# Patient Record
Sex: MALE | Race: WHITE | Employment: FULL TIME | URBAN - METROPOLITAN AREA
[De-identification: names, ages, dates, MRNs, and addresses within clinical notes are randomized per-mention and may not be internally consistent; named-entity substitution may affect disease eponyms.]

---

## 2019-11-12 DIAGNOSIS — I71.21 ASCENDING AORTIC ANEURYSM: Primary | ICD-10-CM

## 2019-12-06 ENCOUNTER — TELEPHONE (OUTPATIENT)
Dept: CARDIOTHORACIC SURGERY | Age: 54
End: 2019-12-06

## 2020-03-03 ENCOUNTER — HOSPITAL ENCOUNTER (OUTPATIENT)
Dept: MRI IMAGING | Age: 55
Discharge: HOME | End: 2020-03-05
Payer: COMMERCIAL

## 2020-03-03 PROCEDURE — 70551 MRI BRAIN STEM W/O DYE: CPT

## 2020-03-03 PROCEDURE — 72141 MRI NECK SPINE W/O DYE: CPT

## 2020-03-03 PROCEDURE — 73221 MRI JOINT UPR EXTREM W/O DYE: CPT

## 2020-03-03 PROCEDURE — 72146 MRI CHEST SPINE W/O DYE: CPT

## 2020-03-04 ENCOUNTER — APPOINTMENT (OUTPATIENT)
Dept: CT IMAGING | Age: 55
End: 2020-03-04
Payer: COMMERCIAL

## 2020-03-04 ENCOUNTER — HOSPITAL ENCOUNTER (EMERGENCY)
Age: 55
Discharge: HOME | End: 2020-03-04
Attending: EMERGENCY MEDICINE
Payer: COMMERCIAL

## 2020-03-04 ENCOUNTER — APPOINTMENT (OUTPATIENT)
Dept: GENERAL RADIOLOGY | Age: 55
End: 2020-03-04
Payer: COMMERCIAL

## 2020-03-04 VITALS
OXYGEN SATURATION: 98 % | WEIGHT: 220 LBS | SYSTOLIC BLOOD PRESSURE: 128 MMHG | TEMPERATURE: 97.6 F | DIASTOLIC BLOOD PRESSURE: 87 MMHG | HEART RATE: 78 BPM | RESPIRATION RATE: 18 BRPM | BODY MASS INDEX: 28.23 KG/M2 | HEIGHT: 74 IN

## 2020-03-04 LAB
ALBUMIN SERPL-MCNC: 4 G/DL (ref 3.5–5.2)
ALP BLD-CCNC: 106 U/L (ref 40–129)
ALT SERPL-CCNC: 22 U/L (ref 0–40)
ANION GAP SERPL CALCULATED.3IONS-SCNC: 11 MMOL/L (ref 7–16)
AST SERPL-CCNC: 19 U/L (ref 0–39)
BASOPHILS ABSOLUTE: 0.09 E9/L (ref 0–0.2)
BASOPHILS RELATIVE PERCENT: 1.1 % (ref 0–2)
BILIRUB SERPL-MCNC: <0.2 MG/DL (ref 0–1.2)
BUN BLDV-MCNC: 25 MG/DL (ref 6–20)
CALCIUM SERPL-MCNC: 9.7 MG/DL (ref 8.6–10.2)
CHLORIDE BLD-SCNC: 101 MMOL/L (ref 98–107)
CO2: 27 MMOL/L (ref 22–29)
CREAT SERPL-MCNC: 0.6 MG/DL (ref 0.7–1.2)
EOSINOPHILS ABSOLUTE: 0.26 E9/L (ref 0.05–0.5)
EOSINOPHILS RELATIVE PERCENT: 3.2 % (ref 0–6)
GFR AFRICAN AMERICAN: >60
GFR NON-AFRICAN AMERICAN: >60 ML/MIN/1.73
GLUCOSE BLD-MCNC: 99 MG/DL (ref 74–99)
HCT VFR BLD CALC: 32.5 % (ref 37–54)
HEMOGLOBIN: 10.2 G/DL (ref 12.5–16.5)
IMMATURE GRANULOCYTES #: 0.04 E9/L
IMMATURE GRANULOCYTES %: 0.5 % (ref 0–5)
LYMPHOCYTES ABSOLUTE: 2.91 E9/L (ref 1.5–4)
LYMPHOCYTES RELATIVE PERCENT: 35.5 % (ref 20–42)
MCH RBC QN AUTO: 30.9 PG (ref 26–35)
MCHC RBC AUTO-ENTMCNC: 31.4 % (ref 32–34.5)
MCV RBC AUTO: 98.5 FL (ref 80–99.9)
MONOCYTES ABSOLUTE: 0.88 E9/L (ref 0.1–0.95)
MONOCYTES RELATIVE PERCENT: 10.7 % (ref 2–12)
NEUTROPHILS ABSOLUTE: 4.01 E9/L (ref 1.8–7.3)
NEUTROPHILS RELATIVE PERCENT: 49 % (ref 43–80)
PDW BLD-RTO: 13.9 FL (ref 11.5–15)
PLATELET # BLD: 595 E9/L (ref 130–450)
PMV BLD AUTO: 8.6 FL (ref 7–12)
POTASSIUM REFLEX MAGNESIUM: 4.1 MMOL/L (ref 3.5–5)
RBC # BLD: 3.3 E12/L (ref 3.8–5.8)
SODIUM BLD-SCNC: 139 MMOL/L (ref 132–146)
TOTAL PROTEIN: 7.1 G/DL (ref 6.4–8.3)
WBC # BLD: 8.2 E9/L (ref 4.5–11.5)

## 2020-03-04 PROCEDURE — 73000 X-RAY EXAM OF COLLAR BONE: CPT

## 2020-03-04 PROCEDURE — 80053 COMPREHEN METABOLIC PANEL: CPT

## 2020-03-04 PROCEDURE — 74177 CT ABD & PELVIS W/CONTRAST: CPT

## 2020-03-04 PROCEDURE — 85025 COMPLETE CBC W/AUTO DIFF WBC: CPT

## 2020-03-04 PROCEDURE — 6360000004 HC RX CONTRAST MEDICATION: Performed by: RADIOLOGY

## 2020-03-04 PROCEDURE — 99284 EMERGENCY DEPT VISIT MOD MDM: CPT

## 2020-03-04 RX ORDER — ACETAMINOPHEN 500 MG
1000 TABLET ORAL EVERY 6 HOURS PRN
COMMUNITY

## 2020-03-04 RX ORDER — ASPIRIN 325 MG
325 TABLET ORAL EVERY 6 HOURS PRN
COMMUNITY
End: 2020-04-15 | Stop reason: ALTCHOICE

## 2020-03-04 RX ORDER — ATENOLOL 25 MG/1
25 TABLET ORAL DAILY
COMMUNITY

## 2020-03-04 RX ADMIN — IOPAMIDOL 80 ML: 755 INJECTION, SOLUTION INTRAVENOUS at 18:12

## 2020-03-04 ASSESSMENT — ENCOUNTER SYMPTOMS
COLOR CHANGE: 0
NAUSEA: 0
SHORTNESS OF BREATH: 0
DIARRHEA: 0
ABDOMINAL DISTENTION: 1
ABDOMINAL PAIN: 1
BACK PAIN: 0
VOMITING: 0
BLOOD IN STOOL: 0

## 2020-03-04 ASSESSMENT — PAIN DESCRIPTION - PAIN TYPE: TYPE: ACUTE PAIN

## 2020-03-04 ASSESSMENT — PAIN SCALES - GENERAL: PAINLEVEL_OUTOF10: 4

## 2020-03-04 ASSESSMENT — PAIN DESCRIPTION - LOCATION: LOCATION: SHOULDER;FLANK

## 2020-03-04 ASSESSMENT — PAIN DESCRIPTION - ORIENTATION: ORIENTATION: LEFT

## 2020-03-04 NOTE — ED PROVIDER NOTES
Patient presents to the ED for evaluation. Patient had his spleen removed after an injury he sustained early February. At that time he had several rib fractures and injury to his spleen. He comes in today with a complaint of persistent left shoulder pain and a fullness in his left upper abdomen. Denies any bruising on the abdomen or the shoulder. He did have MRIs of his cervical, thoracic, and shoulder yesterday. Spine MRIs were unremarkable. The shoulder MRI showed findings of AC joint sprain. He does occasionally report dizziness with standing. Denies any associated nausea, vomiting, or diarrhea. No blood in the stool or urine. Rates his level of discomfort a 4 out of 10. Has been taking Tylenol for his discomfort which does provide mild relief. Is not on blood thinners. Review of Systems   Constitutional: Negative for chills, diaphoresis, fatigue and fever. Eyes: Negative for visual disturbance. Respiratory: Negative for shortness of breath. Cardiovascular: Negative for chest pain. Gastrointestinal: Positive for abdominal distention and abdominal pain. Negative for blood in stool, diarrhea, nausea and vomiting. Genitourinary: Negative for difficulty urinating, dysuria and hematuria. Musculoskeletal: Positive for arthralgias (lef shoulder). Negative for back pain. Skin: Negative for color change and pallor. Neurological: Positive for light-headedness (with standing). Hematological: Does not bruise/bleed easily. All other systems reviewed and are negative. Physical Exam  Vitals signs and nursing note reviewed. Constitutional:       General: He is not in acute distress. Appearance: He is well-developed. He is obese. He is not ill-appearing, toxic-appearing or diaphoretic. HENT:      Head: Normocephalic and atraumatic. Eyes:      Conjunctiva/sclera: Conjunctivae normal.   Neck:      Musculoskeletal: Normal range of motion and neck supple. Cardiovascular:      Rate and Rhythm: Normal rate and regular rhythm. Pulses: Normal pulses. Heart sounds: Normal heart sounds. No murmur. Comments: 2+ bilateral radial pulses  Pulmonary:      Effort: Pulmonary effort is normal. No respiratory distress. Breath sounds: Normal breath sounds. No wheezing or rales. Abdominal:      General: Bowel sounds are normal. There is no distension ( I do not appreciate any abdominal distention or fullness in the abdomen. There is no overlying erythema or ecchymosis of the abdomen. ). Palpations: Abdomen is soft. There is no mass. Tenderness: There is no abdominal tenderness. There is no guarding or rebound. Comments: Patient does have a large midline incision that appears to be healing well. No surrounding erythema. No wound dehiscence or discharge present. Musculoskeletal:         General: Tenderness ( Mild tenderness to palpation of patient's left clavicle around the midshaft. There is a slight deformity noted.) present. No deformity. Skin:     General: Skin is warm and dry. Neurological:      Mental Status: He is alert and oriented to person, place, and time. Cranial Nerves: No cranial nerve deficit. Coordination: Coordination normal.          Procedures     MDM        ED Course as of Mar 04 1946   Wed Mar 04, 2020   3361 Patient resting in bed no distress. Discussed results of labs and imaging. Discussed that the CT does show a possible  postop seroma/hematoma or abscess. Discussed with patient that he has no fever and that the incisions look great. Less likely to be an abscess. Most likely to be either a hematoma or a seroma. Also discussed that his clavicle imaging does show a mid clavicle fracture. We will place him in a sling. I will contact his surgeon at this time for further evaluation and management recommendation. [MS]   1945 Spoke with Dr. Kim Castañeda for Dr Owen Marvin ([facility]). Discussed case. She agrees that this is most likely a postop seroma versus hematoma and less likely abscess given patient's normal vitals and lab work. She is okay with the patient following up in the clinic next week. I discussed this conversation with the patient and spouse. They are comfortable and discharged home and he will follow-up next Wednesday at the clinic. He understands if he has worsening symptoms or if they have new concerns that he can be brought back for further evaluation. [MS]      ED Course User Index  [MS] Tobias Mayen, DO       --------------------------------------------- PAST HISTORY ---------------------------------------------  Past Medical History:  has a past medical history of Ascending aortic aneurysm ([address] GERD (gastroesophageal reflux disease), Hyperlipidemia, and Hypertension. Past Surgical History:  has a past surgical history that includes Appendectomy and Splenectomy. Social History:  reports that he has never smoked. He has never used smokeless tobacco. He reports current alcohol use. He reports that he does not use drugs. Family History: family history includes Diabetes in his father; Hypertension in his brother and mother; Stroke in his brother. The patients home medications have been reviewed. Allergies: Patient has no known allergies.     -------------------------------------------------- RESULTS -------------------------------------------------  Labs:  Results for orders placed or performed during the hospital encounter of 03/04/20   CBC Auto Differential   Result Value Ref Range    WBC 8.2 4.5 - 11.5 E9/L    RBC 3.30 (L) 3.80 - 5.80 E12/L    Hemoglobin 10.2 (L) 12.5 - 16.5 g/dL    Hematocrit 32.5 (L) 37.0 - 54.0 %    MCV 98.5 80.0 - 99.9 fL    MCH 30.9 26.0 - 35.0 pg    MCHC 31.4 (L) 32.0 - 34.5 %    RDW 13.9 11.5 - 15.0 fL    Platelets 052 (H) 951 - 450 E9/L    MPV 8.6 7.0 - 12.0 fL    Neutrophils % 49.0 43.0 - 80.0 %    Immature Granulocytes % 0.5 0.0 - 5.0 % Lymphocytes % 35.5 20.0 - 42.0 %    Monocytes % 10.7 2.0 - 12.0 %    Eosinophils % 3.2 0.0 - 6.0 %    Basophils % 1.1 0.0 - 2.0 %    Neutrophils Absolute 4.01 1.80 - 7.30 E9/L    Immature Granulocytes # 0.04 E9/L    Lymphocytes Absolute 2.91 1.50 - 4.00 E9/L    Monocytes Absolute 0.88 0.10 - 0.95 E9/L    Eosinophils Absolute 0.26 0.05 - 0.50 E9/L    Basophils Absolute 0.09 0.00 - 0.20 E9/L   Comprehensive Metabolic Panel w/ Reflex to MG   Result Value Ref Range    Sodium 139 132 - 146 mmol/L    Potassium reflex Magnesium 4.1 3.5 - 5.0 mmol/L    Chloride 101 98 - 107 mmol/L    CO2 27 22 - 29 mmol/L    Anion Gap 11 7 - 16 mmol/L    Glucose 99 74 - 99 mg/dL    BUN 25 (H) 6 - 20 mg/dL    CREATININE 0.6 (L) 0.7 - 1.2 mg/dL    GFR Non-African American >60 >=60 mL/min/1.73    GFR African American >60     Calcium 9.7 8.6 - 10.2 mg/dL    Total Protein 7.1 6.4 - 8.3 g/dL    Alb 4.0 3.5 - 5.2 g/dL    Total Bilirubin <0.2 0.0 - 1.2 mg/dL    Alkaline Phosphatase 106 40 - 129 U/L    ALT 22 0 - 40 U/L    AST 19 0 - 39 U/L       Radiology:  XR CLAVICLE LEFT   Final Result   Mid clavicle fracture      CT ABDOMEN PELVIS W IV CONTRAST Additional Contrast? None   Final Result   Postoperative fluid collection left upper abdomen   posteriorly could be hematoma, seroma, abscess. ------------------------- NURSING NOTES AND VITALS REVIEWED ---------------------------  Date / Time Roomed:  3/4/2020  3:56 PM  ED Bed Assignment:  07/07    The nursing notes within the ED encounter and vital signs as below have been reviewed.    /87   Pulse 78   Temp 97.6 °F (36.4 °C) (Oral)   Resp 18   Ht 6' 2\" (1.88 m)   Wt 220 lb (99.8 kg)   SpO2 98%   BMI 28.25 kg/m²   Oxygen Saturation Interpretation: Normal      ------------------------------------------ PROGRESS NOTES ------------------------------------------  I have spoken with the patient and discussed todays results, in addition to providing specific details for the plan of care and counseling regarding the diagnosis and prognosis. Their questions are answered at this time and they are agreeable with the plan. I discussed at length with them reasons for immediate return here for re evaluation. They will followup with primary care by calling their office tomorrow. --------------------------------- ADDITIONAL PROVIDER NOTES ---------------------------------  At this time the patient is without objective evidence of an acute process requiring hospitalization or inpatient management. They have remained hemodynamically stable throughout their entire ED visit and are stable for discharge with outpatient follow-up. The plan has been discussed in detail and they are aware of the specific conditions for emergent return, as well as the importance of follow-up. New Prescriptions    No medications on file       Diagnosis:  1. Post-operative pain    2. Closed nondisplaced fracture of shaft of left clavicle, initial encounter        Disposition:  Patient's disposition: Discharge to home  Patient's condition is stable.              Nieves Romano DO  03/04/20 1946

## 2020-03-20 ENCOUNTER — TELEPHONE (OUTPATIENT)
Dept: PHYSICAL MEDICINE AND REHAB | Age: 55
End: 2020-03-20

## 2020-03-25 ENCOUNTER — TELEPHONE (OUTPATIENT)
Dept: PHYSICAL MEDICINE AND REHAB | Age: 55
End: 2020-03-25

## 2020-03-25 NOTE — TELEPHONE ENCOUNTER
Per [employer], we can schedule a virtual visit for the concussion evaluation. I called the patient and left a message on his voicemail to return call and let us know if he would be interested in scheduling a video visit.

## 2020-03-31 ENCOUNTER — TELEMEDICINE (OUTPATIENT)
Dept: PHYSICAL MEDICINE AND REHAB | Age: 55
End: 2020-03-31
Payer: COMMERCIAL

## 2020-03-31 PROCEDURE — 96132 NRPSYC TST EVAL PHYS/QHP 1ST: CPT | Performed by: PHYSICAL MEDICINE & REHABILITATION

## 2020-04-02 ENCOUNTER — TELEMEDICINE (OUTPATIENT)
Dept: PHYSICAL MEDICINE AND REHAB | Age: 55
End: 2020-04-02
Payer: COMMERCIAL

## 2020-04-02 PROCEDURE — 99203 OFFICE O/P NEW LOW 30 MIN: CPT | Performed by: PHYSICAL MEDICINE & REHABILITATION

## 2020-04-02 PROCEDURE — G8428 CUR MEDS NOT DOCUMENT: HCPCS | Performed by: PHYSICAL MEDICINE & REHABILITATION

## 2020-04-02 PROCEDURE — 3017F COLORECTAL CA SCREEN DOC REV: CPT | Performed by: PHYSICAL MEDICINE & REHABILITATION

## 2020-04-02 RX ORDER — DEXTROMETHORPHAN HYDROBROMIDE AND QUINIDINE SULFATE 20; 10 MG/1; MG/1
1 CAPSULE, GELATIN COATED ORAL DAILY
Qty: 30 CAPSULE | Refills: 0 | Status: SHIPPED
Start: 2020-04-02 | End: 2020-07-07

## 2020-04-02 NOTE — PROGRESS NOTES
Chrystal Rodriguez D.O., P.T. [facility] Physical Medicine and Rehabilitation  [address] [facility], [address] [place] [address] [place]  Phone: [phone]  Fax: [phone]    PCP: Shayne Jefferson DO  Date of visit: 4/2/20  Start time: 11:28   End time: 11:52  Services were provided through a video synchronous discussion virtually to substitute for in-person clinic visit through [facility]. The patient was advised of the risks, benefits and alternatives to telemedicine and agreed to proceed with this type of visit. Pursuant to the emergency declaration under the [address] [place] [address] waiver authority and the [employer] Act, this Virtual  Visit was conducted, with patient's consent, to reduce the patient's risk of exposure to COVID-19 and provide continuity of care. The patient was also advised the privacy standards of a standard visit continue to apply to telemedicine visits. Chief Complaint   Patient presents with    Concussion       Dear Karel Lott,,    As you know,  Belen Magana While is a 48 y.o.  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 He sustained four rib fractures, left clavicle and spleen ruptured requiring splenectomy. He had a laceration on occiput which was repaired with staples  He received his initial care at [facility]. He lost consciousness for an unknown length of time. EMS was called and took him to the hospital and the first thing he recalls is waking up to EMS at the scene. He was referred here by occupational health. He has not returned to work at this time. He currently has symptoms of dizziness, headache, blurry vision, fatigue. There has been no prior history of concussion. He is also having temper and anger outbursts, crying inappropriately, laughing inappropriately.  He feels like he is having difficulty coping General: chills, fatigue, fever, malaise, night sweats, weight gain,  weight loss. Psychological: anxiety, depression, suicidal ideation, sleep disturbances, behavioral disorder, difficulty concentrating, disorientation, hallucinations, mood swings, obsessive thoughts, physical abuse,  sexual abuse. Ophthalmic: blurry vision, decreased vision, double vision, loss of vision, photophobia, use of corrective device. Ear Nose Throat: hearing loss, tinnitus, phonophobia, sensitivity to smells, vertigo, or vocal changes. Allergy/Immunology: seasonal allergies, watery eyes, itchy eyes, frequent infections. Hematological and Lymphatic: bleeding problems, blood clots, bruising,  yellowing of the skin, swollen lymph nodes. Endocrine:  polydypsia, polyuria, temperature intolerance. Respiratory: cough, shortness of breath, wheezing. Cardiovascular: syncope, chest pain, dyspnea on exertion, edema, irregular heartbeat,  palpitations. Gastrointestinal: abdominal pain, constipation, diarrhea,  decreased appetite, heartburn, hematemesis, melena, nausea, vomiting, stool incontinence, abnormal swallowing. Genito-Urinary: dysuria, hematuria, incontinence, frequency, urgency. Musculoskeletal: joint pain, stiffness, swelling, muscle pain, muscle  tenderness. Neurological: confusion, memory loss, dizziness, gait disturbance, headaches, impaired coordination, decreased balance, numbness/tingling, seizures, speech problems, tremors,weakness. Dermatological:  hair changes, nail changes, pruritus, rash. Physical Exam: Respiratory rate was: 20 General: The patient is in no apparent distress. Body habitus is non-obese. HEENT: No rhinorrhea, sneezing, yawning, or lacrimation. No scleral icterus or conjunctival injection. SKIN: No piloerection. No track marks. No rash. No erythema or ecchymosis. Psychological: Mood and affect are appropriate. Hygiene appears appropriate. Cardiovascular: There is no edema.   Respiratory: Respirations are

## 2020-04-02 NOTE — PATIENT INSTRUCTIONS
doctor or physical therapist will then help you to sit back up. Your legs will hang off the table on the same side that you were facing. Follow-up care is a key part of your treatment and safety. Be sure to make and go to all appointments, and call your doctor if you are having problems. It's also a good idea to know your test results and keep a list of the medicines you take. Where can you learn more? Go to https://chpepiceweb.[email or web address] org and sign in to your [employer] account. Enter W742 in the [facility] box to learn more about \"Epley Maneuver for Vertigo: Exercises. \"     If you do not have an account, please click on the \"Sign Up Now\" link. Current as of: July 28, 2019Content Version: 12.4  © 5877-3383 Healthwise, Incorporated. Care instructions adapted under license by [facility] ([facility]). If you have questions about a medical condition or this instruction, always ask your healthcare professional. [place] [address] any warranty or liability for your use of this information. Patient Education        [place] Exercises for Vertigo: Care Instructions  Your Care Instructions  Simple exercises can help you regain your balance when you have vertigo. If you have Ménière's disease, benign paroxysmal positional vertigo (BPPV), or another inner ear problem, you may have vertigo off and on. Do these exercises first thing in the morning and before you go to bed. You might get dizzy when you first start them. If this happens, try to do them for at least 5 minutes. Do a group of exercises at a time, starting at the top of the list. It may take several weeks before you can do all the exercises without feeling dizzy. Follow-up care is a key part of your treatment and safety. Be sure to make and go to all appointments, and call your doctor if you are having problems. It's also a good idea to know your test results and keep a list of the medicines you take.   How can you care for yourself at home? Exercise 1  While sitting on the side of the bed and holding your head still:  · Look up as far as you can. · Look down as far as you can. · Look from side to side as far as you can. · Stretch your arm straight out in front of you. Focus on your index finger. Continue to focus on your finger while you bring it to your nose. Exercise 2  While sitting on the side of the bed:  · Bring your head as far back as you can. · Bring your head forward to touch your chin to your chest.  · Turn your head from side to side. · Do these exercises first with your eyes open. Then try with your eyes closed. Exercise 3  While sitting on the side of the bed:  · Shrug your shoulders straight upward, then relax them. · Bend over and try to touch the ground with your fingers. Then go back to a sitting position. · Toss a small ball from one hand to the other. Throw the ball higher than your eyes so you have to look up. Exercise 4  While standing (with someone close by if you feel uncomfortable):  · Repeat Exercise 1.  · Repeat Exercise 2.  · Pass a ball between your legs and above your head. · Sit down and then stand up. Repeat. Turn around in a [place] a different way each time you stand. · With someone close by to help you, try the above exercises with your eyes closed. Exercise 5  In a room that is cleared of obstacles:  · Walk to a corner of the room, turn to your right, and walk back to the starting point. Now, repeat and turn left. · Walk up and down a slope. Now try stairs. · While holding on to someone's arm, try these exercises with your eyes closed. When should you call for help? Watch closely for changes in your health, and be sure to contact your doctor if:    · You do not get better as expected. Where can you learn more? Go to https://chtonyeb.Athlete Builder. org and sign in to your [employer] account.  Enter K001 in the [facility] box to learn more about [facility] Exercises have to be put on hold while you get treatment, that is okay. Do what you have to do to get better, and then you can focus on the rest of your life. Some acute illnesses can become chronic, which means that they last for a long time. Although this may not happen with you, it is best to be prepared for this and to know how to handle it. Talk to your doctor to find out as much as you can about the illness and how best to treat it. Follow-up care is a key part of your treatment and safety. Be sure to make and go to all appointments, and call your doctor if you are having problems. It's also a good idea to know your test results and keep a list of the medicines you take. How can you care for yourself at home? Treating the illness  · Take your medicines exactly as prescribed. Call your doctor if you think you are having a problem with your medicine. You will get more details on the specific medicines your doctor prescribes. · Take pain medicines exactly as directed. ? If the doctor gave you a prescription medicine for pain, take it as prescribed. ? If you are not taking a prescription pain medicine, ask your doctor if you can use an over-the-counter medicine. · Tell your doctor about any over-the-counter medicines you take. These medicines can cause problems when taken with other medicines. · Keep in touch with your doctor. The more informed you keep your doctor, the better he or she will be able to care for you. · Get plenty of rest. Talk with your doctor if you have trouble sleeping because of pain. · Eat a balanced diet. Talk with your doctor about what type of diet may be best.  · Do not smoke. Smoking or being around smoke can make the condition worse. If you need help quitting, talk to your doctor about stop-smoking programs and medicines. These can increase your chances of quitting for good. Keeping your life in order  Here are some tips on how to keep your life on track while you get better.   · Talk with your insurance provider to make sure treatments are covered. · Make sure your employer knows about the illness. Get work commitments taken care of or postponed. This will give you the time and energy you need to treat the illness. · Keep your employer informed about when you will be able to return. · Ask for help from friends and family to keep your home in order. Keep your surroundings clean and in good shape to help reduce stress. Get help so you can save your energy for treating the illness. Questions to ask  · Can the illness be cured? Can the illness become long-lasting (chronic)? · How will my lifestyle change once the illness is gone? · Does the illness run in families? · What types of treatment are available? Which treatment has the best success rate? · Are there any side effects? · What are the best and worst possible results of the treatment? When should you call for help? Watch closely for changes in your health, and be sure to contact your doctor if:    · You do not get better as expected. Where can you learn more? Go to https://PinchPoint.iKure Techsoft. org and sign in to your [employer]â„¢ account. Enter D576 in the [facility] box to learn more about \"Coping With an Acute Illness: Care Instructions. \"     If you do not have an account, please click on the \"Sign Up Now\" link. Current as of: December 15, 2019Content Version: 12.4  © 8718-2391 Healthwise, Incorporated. Care instructions adapted under license by [facility] ([facility]). If you have questions about a medical condition or this instruction, always ask your healthcare professional. [place] [address] any warranty or liability for your use of this information.

## 2020-04-14 ENCOUNTER — TELEMEDICINE (OUTPATIENT)
Dept: PHYSICAL MEDICINE AND REHAB | Age: 55
End: 2020-04-14
Payer: COMMERCIAL

## 2020-04-14 PROCEDURE — 96132 NRPSYC TST EVAL PHYS/QHP 1ST: CPT | Performed by: PHYSICAL MEDICINE & REHABILITATION

## 2020-04-15 ENCOUNTER — TELEMEDICINE (OUTPATIENT)
Dept: PHYSICAL MEDICINE AND REHAB | Age: 55
End: 2020-04-15
Payer: COMMERCIAL

## 2020-04-15 ENCOUNTER — TELEPHONE (OUTPATIENT)
Dept: PHYSICAL MEDICINE AND REHAB | Age: 55
End: 2020-04-15

## 2020-04-15 PROCEDURE — G8427 DOCREV CUR MEDS BY ELIG CLIN: HCPCS | Performed by: PHYSICAL MEDICINE & REHABILITATION

## 2020-04-15 PROCEDURE — 3017F COLORECTAL CA SCREEN DOC REV: CPT | Performed by: PHYSICAL MEDICINE & REHABILITATION

## 2020-04-15 PROCEDURE — 99214 OFFICE O/P EST MOD 30 MIN: CPT | Performed by: PHYSICAL MEDICINE & REHABILITATION

## 2020-04-16 ENCOUNTER — OFFICE VISIT (OUTPATIENT)
Dept: ORTHOPEDIC SURGERY | Age: 55
End: 2020-04-16
Payer: COMMERCIAL

## 2020-04-16 VITALS — WEIGHT: 225 LBS | HEIGHT: 74 IN | BODY MASS INDEX: 28.88 KG/M2 | TEMPERATURE: 97.7 F

## 2020-04-16 PROCEDURE — 99243 OFF/OP CNSLTJ NEW/EST LOW 30: CPT | Performed by: ORTHOPAEDIC SURGERY

## 2020-04-16 NOTE — PROGRESS NOTES
30 capsule 0     No current facility-administered medications for this visit. No Known Allergies    Social History     Socioeconomic History    Marital status:      Spouse name: None    Number of children: None    Years of education: None    Highest education level: None   Occupational History    None   Social Needs    Financial resource strain: None    Food insecurity     Worry: None     Inability: None    Transportation needs     Medical: None     Non-medical: None   Tobacco Use    Smoking status: Never Smoker    Smokeless tobacco: Never Used   Substance and Sexual Activity    Alcohol use: Yes     Comment: social    Drug use: Never    Sexual activity: Yes   Lifestyle    Physical activity     Days per week: None     Minutes per session: None    Stress: None   Relationships    Social connections     Talks on phone: None     Gets together: None     Attends Jewish service: None     Active member of club or organization: None     Attends meetings of clubs or organizations: None     Relationship status: None    Intimate partner violence     Fear of current or ex partner: None     Emotionally abused: None     Physically abused: None     Forced sexual activity: None   Other Topics Concern    None   Social History Narrative    None       Family History   Problem Relation Age of Onset    Hypertension Mother     Diabetes Father     Hypertension Brother     Stroke Brother          Review of Systems   No fever, chills, or other constitutionalsymptoms. No numbness or other neuro symptoms. [unfilled]   Left shoulder has minimal diffuse prominence over the left mid clavicle without significant tenderness. No adverse skin changes. No bony spikes. He is able to demonstrate range of motion left shoulder lacking only 10 degrees of forward flexion and abduction with minimal discomfort. Internal rotation to T12 versus T8 on the right. Abduction strength grossly intact.     Physical

## 2020-06-05 ENCOUNTER — OFFICE VISIT (OUTPATIENT)
Dept: PHYSICAL MEDICINE AND REHAB | Age: 55
End: 2020-06-05
Payer: COMMERCIAL

## 2020-06-05 VITALS
WEIGHT: 231 LBS | SYSTOLIC BLOOD PRESSURE: 124 MMHG | HEIGHT: 74 IN | BODY MASS INDEX: 29.65 KG/M2 | DIASTOLIC BLOOD PRESSURE: 76 MMHG

## 2020-06-05 PROCEDURE — 96132 NRPSYC TST EVAL PHYS/QHP 1ST: CPT | Performed by: PHYSICAL MEDICINE & REHABILITATION

## 2020-06-05 PROCEDURE — 99213 OFFICE O/P EST LOW 20 MIN: CPT | Performed by: PHYSICAL MEDICINE & REHABILITATION

## 2020-06-05 RX ORDER — LEVOCETIRIZINE DIHYDROCHLORIDE 5 MG/1
5 TABLET, FILM COATED ORAL NIGHTLY PRN
COMMUNITY
Start: 2020-06-04

## 2020-06-05 NOTE — PROGRESS NOTES
APPENDECTOMY      SPLENECTOMY         Social History     Tobacco Use    Smoking status: Never Smoker    Smokeless tobacco: Never Used   Substance Use Topics    Alcohol use: Yes     Comment: social    Drug use: Never       Family History   Problem Relation Age of Onset    Hypertension Mother     Diabetes Father     Hypertension Brother     Stroke Brother        Current Outpatient Medications   Medication Sig Dispense Refill    levocetirizine (XYZAL) 5 MG tablet       atenolol (TENORMIN) 25 MG tablet Take 25 mg by mouth daily      Multiple Vitamin (MULTIVITAMIN PO) Take 1 tablet by mouth daily.  fish oil-omega-3 fatty acids 1000 MG capsule Take  by mouth daily.  dextromethorphan-quiNIDine (NUEDEXTA) 20-10 MG CAPS per capsule Take 1 capsule by mouth daily (Patient not taking: Reported on 4/15/2020) 30 capsule 0    acetaminophen (TYLENOL) 500 MG tablet Take 1,000 mg by mouth every 6 hours as needed for Pain       No current facility-administered medications for this visit. No Known Allergies    Review of Systems:  No new weakness, paresthesia, incontinence of bowel or bladder, saddle anesthesia, falls or gait dysfunction. Otherwise, per HPI. Physical Exam:   Blood pressure 124/76, height 6' 2\" (1.88 m), weight 231 lb (104.8 kg). GENERAL: The patient is in no apparent distress. Body habitus is non-obese. Musculoskeletal: No tenderness to palpation at SCM, trapezius, cervical paraspinals. No evidence of any trigger points. No reproduction of headache at greater occipital nerve. ROM is full and painless in the spine and extremities. Neurologic:  Cognitive exam: Awake, alert and oriented in three planes. Speech is fluent. Reasoning is abstract. Judgement, planning and problem solving are intact. Attention is intact. Immediate recall is 3/3. Delayed recall is 3/3. Calculations are intact. Cranial nerves: No facial weakness or sensory loss. Tongue is midline.   Palate elevates impact testing. Thank you for allowing me to participate in the care of your patient. Mare West D.O., P.T.   Board Certified Physical Medicine and Rehabilitation  Board Certified Electrodiagnostic Medicine

## 2020-06-08 ENCOUNTER — OFFICE VISIT (OUTPATIENT)
Dept: ORTHOPEDIC SURGERY | Age: 55
End: 2020-06-08
Payer: COMMERCIAL

## 2020-06-08 VITALS — BODY MASS INDEX: 28.88 KG/M2 | WEIGHT: 225 LBS | HEIGHT: 74 IN | TEMPERATURE: 97.2 F

## 2020-06-08 PROCEDURE — 99213 OFFICE O/P EST LOW 20 MIN: CPT | Performed by: ORTHOPAEDIC SURGERY

## 2020-07-07 ENCOUNTER — OFFICE VISIT (OUTPATIENT)
Dept: PHYSICAL MEDICINE AND REHAB | Age: 55
End: 2020-07-07
Payer: COMMERCIAL

## 2020-07-07 VITALS
DIASTOLIC BLOOD PRESSURE: 82 MMHG | SYSTOLIC BLOOD PRESSURE: 122 MMHG | HEART RATE: 78 BPM | WEIGHT: 233 LBS | HEIGHT: 74 IN | BODY MASS INDEX: 29.9 KG/M2

## 2020-07-07 PROCEDURE — 96132 NRPSYC TST EVAL PHYS/QHP 1ST: CPT | Performed by: PHYSICAL MEDICINE & REHABILITATION

## 2020-07-07 PROCEDURE — G8427 DOCREV CUR MEDS BY ELIG CLIN: HCPCS | Performed by: PHYSICAL MEDICINE & REHABILITATION

## 2020-07-07 PROCEDURE — 1036F TOBACCO NON-USER: CPT | Performed by: PHYSICAL MEDICINE & REHABILITATION

## 2020-07-07 PROCEDURE — 3017F COLORECTAL CA SCREEN DOC REV: CPT | Performed by: PHYSICAL MEDICINE & REHABILITATION

## 2020-07-07 PROCEDURE — G8419 CALC BMI OUT NRM PARAM NOF/U: HCPCS | Performed by: PHYSICAL MEDICINE & REHABILITATION

## 2020-07-07 PROCEDURE — 99214 OFFICE O/P EST MOD 30 MIN: CPT | Performed by: PHYSICAL MEDICINE & REHABILITATION

## 2020-07-07 NOTE — PROGRESS NOTES
Stella Caballero D.O. [facility]  [address] [place]  Phone: [phone]  Fax: [phone]        7/7/20    Chief Complaint   Patient presents with    Concussion     1 month follow up worker comp concussion with impact       HPI:  Walter Collier While is a 48y.o. year old man seen today in follow up regarding concussion. Interval history: Since the last visit the patient continues to have difficulty with balance issues. The vertigo has improved when changing positions. He had his therapy placed on hold for 2 weeks and just resumed PT yesterday. He has completed 30 visits in PT. He has been seeing a counselor via telemedicine. He is intermittently taking Tylenol for his headache. He is getting a headache only a few times a week and they are lasting a few hours and responds to rest. Today, the pain is rated Pain Score:   1 where 0 is no pain and 10 is pain as bad as it can be. The pain is located in the left temporparietal region,  does not radiate, and is described as aching. This pain occurs intermittently. The symptoms have been better since onset. Symptoms are exacerbated by moving. Factors which relieve the pain include rest. Other associated symptoms include mood swings, anger outbursts, vertigo. Otherwise, the pain assessment has not changed since the last visit.      Review of systems    Headache Yes   Nausea  No   Vomiting No   Balance problems Yes   Dizziness Yes   Fatigue Yes   Trouble falling asleep Yes   Sleeping more than usual No   Sleeping less than usual Yes   drowsiness Yes   Sensitivity to light Yes   Sensitivity to noise Yes   Irritability Yes   Sadness Yes   Nervousness Yes   Feeling more emotional Yes   Numbness or tingling Yes   Feeling slowed down Yes   Feeling mentally foggy Yes   Difficulty concentrating Yes   Difficulty remembering Yes   Visual problems Yes     Past Medical History:   Diagnosis Date    Ascending aortic aneurysm ([address] 05/01/09    per cta, 4.0 cm    GERD (gastroesophageal reflux disease)     Hyperlipidemia     borderline    Hypertension     borderline     Past Surgical History:   Procedure Laterality Date    APPENDECTOMY      SPLENECTOMY       Social History     Tobacco Use    Smoking status: Never Smoker    Smokeless tobacco: Never Used   Substance Use Topics    Alcohol use: Yes     Comment: social    Drug use: Never       Family History   Problem Relation Age of Onset    Hypertension Mother     Diabetes Father     Hypertension Brother     Stroke Brother        Current Outpatient Medications   Medication Sig Dispense Refill    levocetirizine (XYZAL) 5 MG tablet Take 5 mg by mouth nightly       atenolol (TENORMIN) 25 MG tablet Take 25 mg by mouth daily      acetaminophen (TYLENOL) 500 MG tablet Take 1,000 mg by mouth every 6 hours as needed for Pain      Multiple Vitamin (MULTIVITAMIN PO) Take 1 tablet by mouth daily.  fish oil-omega-3 fatty acids 1000 MG capsule Take  by mouth daily. No current facility-administered medications for this visit. No Known Allergies    Review of Systems:  No new weakness, paresthesia, incontinence of bowel or bladder, saddle anesthesia, falls or gait dysfunction. Otherwise, per HPI. Physical Exam:   Blood pressure 122/82, pulse 78, height 6' 2\" (1.88 m), weight 233 lb (105.7 kg). GENERAL: The patient is in no apparent distress. Body habitus is non-obese. Musculoskeletal: No tenderness to palpation at SCM, trapezius, cervical paraspinals. No evidence of any trigger points. No reproduction of headache at greater occipital nerve. ROM is full and painless in the spine and extremities. Neurologic:  Cognitive exam: Awake, alert and oriented in three planes. Speech is fluent. Reasoning is abstract. Judgement, planning and problem solving are intact. Attention is intact. Immediate recall is 3/3. Delayed recall is 3/3. Calculations are intact. Cranial nerves: No facial weakness or sensory loss. Tongue is midline. Palate elevates symmetrically. Hearing is intact for conversation. Pupils are equal and round. Extraocular muscles are intact. Shoulder shrug is symmetric. Sensation: Intact for light touch and pin prick in all upper and lower extremity dermatomes. Strength: 5/5 in all myotomes in the upper and lower extremities. Muscle tendon reflexes were 2+ and symmetric in the upper and lower extremities. There is no hyperalgesia or allodynia. Babinski is downgoing bilaterally. Pacheco Holster is negative bilaterally. Coordination in the upper and lower extremities is normal. Gait is Normal.  No clonus or spasticity. The patient was able to rise from a chair and squat without difficulty. There is no tremor. Vestibular examination: Nystagmus: No.  Smooth pursuits on EOM testing. No abnormal saccades on vertical and horizontal testing. Convergence  is <6 cm normal. No blurring or double vision with testing of VOR horizontally and vertically. Balance exam: Romberg: +. Unilateral stance unsteady bilaterally. Impression:   1. Concussion with loss of consciousness of 30 minutes or less, sequela ([address]    2. Posttraumatic vertigo    3. Acute post-traumatic headache, not intractable    4. Pseudobulbar affect        Plan:  Continue PT vestibular rehab  Continue counseling. IMPACT TESTING: I have reviewed the post injury IMPACT testing reports. The complete IMPACT report is attached to this encounter. A baseline was not available for comparison so percentile scores were used for analysis. Compared to normal values and academic status all domains were impaired. Visual motor speed composite is stable. Visual memory composite has improved significantly. Symptom score has decreased from 72 to 46. The patient was educated about the diagnosis, prognosis, indications, risks and benefits of treatment.  An opportunity to ask questions was given to the patient and questions were answered. The patient agreed to proceed with the recommended treatment as described above. Return in about 30 days (around 8/6/2020) for with impact testing. Thank you for allowing me to participate in the care of your patient. Ninfa Carpio D.O., P.T.   Board Certified Physical Medicine and Rehabilitation  Board Certified Electrodiagnostic Medicine

## 2020-07-07 NOTE — PATIENT INSTRUCTIONS
Patient Education        Neck Spasm: Exercises  Introduction  Here are some examples of exercises for you to try. The exercises may be suggested for a condition or for rehabilitation. Start each exercise slowly. Ease off the exercises if you start to have pain. You will be told when to start these exercises and which ones will work best for you. How to do the exercises  Levator scapula stretch   1. Sit in a firm chair, or stand up straight. 2. Gently tilt your head toward your left shoulder. 3. Turn your head to look down into your armpit, bending your head slightly forward. Let the weight of your head stretch your neck muscles. 4. Hold for 15 to 30 seconds. 5. Return to your starting position. 6. Follow the same instructions above, but tilt your head toward your right shoulder. 7. Repeat 2 to 4 times toward each shoulder. Upper trapezius stretch   1. Sit in a firm chair, or stand up straight. 2. This stretch works best if you keep your shoulder down as you lean away from it. To help you remember to do this, start by relaxing your shoulders and lightly holding on to your thighs or your chair. 3. Tilt your head toward your shoulder and hold for 15 to 30 seconds. Let the weight of your head stretch your muscles. 4. If you would like a little added stretch, place your arm behind your back. Use the arm opposite of the direction you are tilting your head. For example, if you are tilting your head to the left, place your right arm behind your back. 5. Repeat 2 to 4 times toward each shoulder. Neck rotation   1. Sit in a firm chair, or stand up straight. 2. Keeping your chin level, turn your head to the right, and hold for 15 to 30 seconds. 3. Turn your head to the left, and hold for 15 to 30 seconds. 4. Repeat 2 to 4 times to each side. Chin tuck   1. Lie on the floor with a rolled-up towel under your neck. Your head should be touching the floor.   2. Slowly bring your chin toward the front of your neck. 3. Hold for a count of 6, and then relax for up to 10 seconds. 4. Repeat 8 to 12 times. Forward neck flexion   1. Sit in a firm chair, or stand up straight. 2. Bend your head forward. 3. Hold for 15 to 30 seconds, then return to your starting position. 4. Repeat 2 to 4 times. Follow-up care is a key part of your treatment and safety. Be sure to make and go to all appointments, and call your doctor if you are having problems. It's also a good idea to know your test results and keep a list of the medicines you take. Where can you learn more? Go to https://[employer]. org and sign in to your [employer] account. Enter P962 in the [employer] box to learn more about \"Neck Spasm: Exercises. \"     If you do not have an account, please click on the \"Sign Up Now\" link. Current as of: March 2, 2020               Content Version: 12.5  © 3085-3271 [employer]wise, [employer]. Care instructions adapted under license by [facility] ([facility]). If you have questions about a medical condition or this instruction, always ask your healthcare professional. [place] [address] any warranty or liability for your use of this information.

## 2020-08-11 ENCOUNTER — OFFICE VISIT (OUTPATIENT)
Dept: PHYSICAL MEDICINE AND REHAB | Age: 55
End: 2020-08-11
Payer: COMMERCIAL

## 2020-08-11 VITALS
HEIGHT: 74 IN | DIASTOLIC BLOOD PRESSURE: 74 MMHG | BODY MASS INDEX: 30.16 KG/M2 | SYSTOLIC BLOOD PRESSURE: 118 MMHG | WEIGHT: 235 LBS

## 2020-08-11 PROCEDURE — 96132 NRPSYC TST EVAL PHYS/QHP 1ST: CPT | Performed by: PHYSICAL MEDICINE & REHABILITATION

## 2020-08-11 PROCEDURE — 99214 OFFICE O/P EST MOD 30 MIN: CPT | Performed by: PHYSICAL MEDICINE & REHABILITATION

## 2020-08-11 NOTE — PROGRESS NOTES
Edwardo Graham D.O. [facility]  [address] [place]  Phone: [phone]  Fax: [phone]        8/11/20    Chief Complaint   Patient presents with    Concussion     WC Follow up with Impact Testing       HPI:  Anthony Chapin is a 48y.o. year old man seen today in follow up regarding concussion. Interval history: Since the last visit the patient has continued in PT. He is still having intermittent vertigo when he turns over in bed to reach for something. They treated him with [place] maneuver and he recovered well. The vertigo has been significantly improved since that time. He has continued with the counselor. Today, the pain is rated Pain Score:   0 - No pain where 0 is no pain and 10 is pain as bad as it can be. He is having only mild intermittent headaches. The pain is located in the left occipital region,  Radiates to the left eye, and is described as pressure. This pain occurs intermittently. The symptoms have been better since onset. Symptoms are exacerbated by over exertion. Factors which relieve the pain include rest. Other associated symptoms include nausea. Otherwise, the pain assessment has not changed since the last visit.      Review of systems    Headache Yes   Nausea  Yes   Vomiting No   Balance problems Yes   Dizziness Yes   Fatigue Yes   Trouble falling asleep Yes   Sleeping more than usual No   Sleeping less than usual Yes   drowsiness Yes   Sensitivity to light Yes   Sensitivity to noise Yes   Irritability Yes   Sadness Yes   Nervousness Yes   Feeling more emotional Yes   Numbness or tingling Yes   Feeling slowed down Yes   Feeling mentally foggy Yes   Difficulty concentrating Yes   Difficulty remembering Yes   Visual problems Yes     Past Medical History:   Diagnosis Date    Ascending aortic aneurysm ([address] 05/01/09    per cta, 4.0 cm    GERD (gastroesophageal reflux disease)     Hyperlipidemia     borderline    Hypertension     borderline     Past Surgical History:   Procedure Laterality Date    APPENDECTOMY      SPLENECTOMY       Social History     Tobacco Use    Smoking status: Never Smoker    Smokeless tobacco: Never Used   Substance Use Topics    Alcohol use: Yes     Comment: social    Drug use: Never     Family History   Problem Relation Age of Onset    Hypertension Mother     Diabetes Father     Hypertension Brother     Stroke Brother      Current Outpatient Medications   Medication Sig Dispense Refill    levocetirizine (XYZAL) 5 MG tablet Take 5 mg by mouth nightly       atenolol (TENORMIN) 25 MG tablet Take 25 mg by mouth daily      acetaminophen (TYLENOL) 500 MG tablet Take 1,000 mg by mouth every 6 hours as needed for Pain      Multiple Vitamin (MULTIVITAMIN PO) Take 1 tablet by mouth daily.  fish oil-omega-3 fatty acids 1000 MG capsule Take  by mouth daily. No current facility-administered medications for this visit. No Known Allergies    Review of Systems:  No new weakness, paresthesia, incontinence of bowel or bladder, saddle anesthesia, falls or gait dysfunction. Otherwise, per HPI. Physical Exam:   Blood pressure 118/74, height 6' 2\" (1.88 m), weight 235 lb (106.6 kg). GENERAL: The patient is in no apparent distress. Body habitus is non-obese. Musculoskeletal: There is no joint effusion, deformity, instability, swelling, erythema or warmth. AROM is full in the spine and extremities. Spinal curvatures are normal.      Neurologic:  No focal sensorimotor deficit. Reflexes 2+ and symmetric. Gait is normal. Heel and toe walking are intact. Tandem walking is unsteady. Impression:   1. Concussion with loss of consciousness of 30 minutes or less, sequela ([address]    2. Posttraumatic vertigo    3. Acute post-traumatic headache, not intractable    4.  Pseudobulbar affect        Plan:  Orders Placed This Encounter   Procedures    CT WORK HARDENING/CONDN,0-2 HR     eval and

## 2020-09-22 ENCOUNTER — OFFICE VISIT (OUTPATIENT)
Dept: PHYSICAL MEDICINE AND REHAB | Age: 55
End: 2020-09-22
Payer: COMMERCIAL

## 2020-09-22 VITALS
WEIGHT: 230 LBS | DIASTOLIC BLOOD PRESSURE: 76 MMHG | HEIGHT: 74 IN | BODY MASS INDEX: 29.52 KG/M2 | SYSTOLIC BLOOD PRESSURE: 122 MMHG | HEART RATE: 68 BPM

## 2020-09-22 PROCEDURE — 99213 OFFICE O/P EST LOW 20 MIN: CPT | Performed by: PHYSICAL MEDICINE & REHABILITATION

## 2020-09-22 PROCEDURE — G8417 CALC BMI ABV UP PARAM F/U: HCPCS | Performed by: PHYSICAL MEDICINE & REHABILITATION

## 2020-09-22 PROCEDURE — 1036F TOBACCO NON-USER: CPT | Performed by: PHYSICAL MEDICINE & REHABILITATION

## 2020-09-22 PROCEDURE — G8427 DOCREV CUR MEDS BY ELIG CLIN: HCPCS | Performed by: PHYSICAL MEDICINE & REHABILITATION

## 2020-09-22 PROCEDURE — 3017F COLORECTAL CA SCREEN DOC REV: CPT | Performed by: PHYSICAL MEDICINE & REHABILITATION

## 2020-09-22 PROCEDURE — 96132 NRPSYC TST EVAL PHYS/QHP 1ST: CPT | Performed by: PHYSICAL MEDICINE & REHABILITATION

## 2020-09-22 NOTE — Clinical Note
Can you please call [facility] and find out why they told this patient he had to have his EMG done by Dr. Thu Bell when he is my patient and does not see Dr. Thu Bell. Patient reported he preferred to have the test done here but his  told him he had to go to Dr. Thu Bell and that makes no sense.

## 2020-09-22 NOTE — PROGRESS NOTES
Stella Caballero D.O. [facility]  [address] [place]  Phone: [phone]  Fax: [phone]        9/22/20    Chief Complaint   Patient presents with    Concussion     workers comp follow up concussion       HPI:  Walter Chapin is a 48y.o. year old man seen today in follow up regarding concussion. Interval history: Since the last visit the patient has started in work conditioning and has been attending for 3 weeks. He states the work conditioning is aggravating his shoulder pain somewhat. Today, the pain is rated Pain Score:   1 where 0 is no pain and 10 is pain as bad as it can be. He is having only mild intermittent headaches. The pain is located in the left occipital region,  Radiates to the left eye, and is described as pressure. This pain occurs intermittently. The symptoms have been better since onset. Symptoms are exacerbated by over exertion. Factors which relieve the pain include rest. Other associated symptoms include nausea. Otherwise, the pain assessment has not changed since the last visit.      Review of systems    Headache No   Nausea  No   Vomiting No   Balance problems Yes   Dizziness No   Fatigue Yes   Trouble falling asleep Yes   Sleeping more than usual No   Sleeping less than usual Yes   drowsiness Yes   Sensitivity to light Yes   Sensitivity to noise Yes   Irritability Yes   Sadness Yes   Nervousness Yes   Feeling more emotional Yes   Numbness or tingling Yes   Feeling slowed down Yes   Feeling mentally foggy Yes   Difficulty concentrating Yes   Difficulty remembering Yes   Visual problems No     Past Medical History:   Diagnosis Date    Ascending aortic aneurysm (HCC) 05/01/09    per cta, 4.0 cm    GERD (gastroesophageal reflux disease)     Hyperlipidemia     borderline    Hypertension     borderline     Past Surgical History:   Procedure Laterality Date    APPENDECTOMY      SPLENECTOMY       Social History attached to this encounter. A baseline was not available for comparison so percentile scores were used for analysis. Compared to normal values and academic status, the verbal memory composite has increased significantly. The visual memory composite is stable but impaired. The visual motor speed composite has also improved. The reaction time composite has improved but is still impaired. The Total symptoms score has improved significantly from 45 to 23. The patient was educated about the diagnosis, prognosis, indications, risks and benefits of treatment. An opportunity to ask questions was given to the patient and questions were answered. The patient agreed to proceed with the recommended treatment as described above. Follow up 1 month     Thank you for allowing me to participate in the care of your patient. Zaida Joshi D.O., P.T.   Board Certified Physical Medicine and Rehabilitation  Board Certified Electrodiagnostic Medicine

## 2020-10-30 ENCOUNTER — OFFICE VISIT (OUTPATIENT)
Dept: PHYSICAL MEDICINE AND REHAB | Age: 55
End: 2020-10-30
Payer: COMMERCIAL

## 2020-10-30 VITALS
BODY MASS INDEX: 30.16 KG/M2 | DIASTOLIC BLOOD PRESSURE: 85 MMHG | HEART RATE: 82 BPM | WEIGHT: 235 LBS | SYSTOLIC BLOOD PRESSURE: 127 MMHG | HEIGHT: 74 IN

## 2020-10-30 PROCEDURE — 99214 OFFICE O/P EST MOD 30 MIN: CPT | Performed by: PHYSICAL MEDICINE & REHABILITATION

## 2020-10-30 PROCEDURE — G8484 FLU IMMUNIZE NO ADMIN: HCPCS | Performed by: PHYSICAL MEDICINE & REHABILITATION

## 2020-10-30 PROCEDURE — 1036F TOBACCO NON-USER: CPT | Performed by: PHYSICAL MEDICINE & REHABILITATION

## 2020-10-30 PROCEDURE — G8427 DOCREV CUR MEDS BY ELIG CLIN: HCPCS | Performed by: PHYSICAL MEDICINE & REHABILITATION

## 2020-10-30 PROCEDURE — 96132 NRPSYC TST EVAL PHYS/QHP 1ST: CPT | Performed by: PHYSICAL MEDICINE & REHABILITATION

## 2020-10-30 PROCEDURE — 3017F COLORECTAL CA SCREEN DOC REV: CPT | Performed by: PHYSICAL MEDICINE & REHABILITATION

## 2020-10-30 PROCEDURE — G8417 CALC BMI ABV UP PARAM F/U: HCPCS | Performed by: PHYSICAL MEDICINE & REHABILITATION

## 2020-10-30 NOTE — PROGRESS NOTES
History:   Procedure Laterality Date    APPENDECTOMY      SPLENECTOMY       Social History     Tobacco Use    Smoking status: Never Smoker    Smokeless tobacco: Never Used   Substance Use Topics    Alcohol use: Yes     Comment: social    Drug use: Never     Family History   Problem Relation Age of Onset    Hypertension Mother     Diabetes Father     Hypertension Brother     Stroke Brother      Current Outpatient Medications   Medication Sig Dispense Refill    levocetirizine (XYZAL) 5 MG tablet Take 5 mg by mouth nightly       atenolol (TENORMIN) 25 MG tablet Take 25 mg by mouth daily      acetaminophen (TYLENOL) 500 MG tablet Take 1,000 mg by mouth every 6 hours as needed for Pain      Multiple Vitamin (MULTIVITAMIN PO) Take 1 tablet by mouth daily.  fish oil-omega-3 fatty acids 1000 MG capsule Take  by mouth daily. No current facility-administered medications for this visit. No Known Allergies    Review of Systems:  No new weakness, paresthesia, incontinence of bowel or bladder, saddle anesthesia, falls or gait dysfunction. Otherwise, per HPI. Physical Exam:   Blood pressure 127/85, pulse 82, height 6' 2\" (1.88 m), weight 235 lb (106.6 kg). GENERAL: The patient is in no apparent distress. Body habitus is non-obese. Musculoskeletal: There is no joint effusion, deformity, instability, swelling, erythema or warmth. AROM is full in the spine and extremities. Spinal curvatures are normal.      Neurologic:  No focal sensorimotor deficit. Reflexes 2+ and symmetric. Gait is normal. Heel and toe walking are intact. Tandem walking is unsteady. Impression:   1. Concussion with loss of consciousness of 30 minutes or less, sequela ([address]    2. Posttraumatic vertigo    3. Acute post-traumatic headache, not intractable    4. Pseudobulbar affect        Plan:  Melatonin at  for sleep    IMPACT TESTING: I have reviewed the post injury IMPACT testing reports.   The complete IMPACT report is attached to this encounter. A baseline was not available for comparison so percentile scores were used for analysis. Compared to normal values and academic status, the verbal and visual memory composites have increased significantly and are in the expected range. The visual motor speed  composite has improved and is in the expected range. The reaction time composite has improved but is still impaired. The Total symptoms score has improved significantly from 23 to 15. Cher Pleva to return to work from the standpoint of the concussion. IW estimates November 30 as RTW date because he has pending following with other specialists for his other allowed conditions. The patient was educated about the diagnosis, prognosis, indications, risks and benefits of treatment. An opportunity to ask questions was given to the patient and questions were answered. The patient agreed to proceed with the recommended treatment as described above. Follow up 1 month (2 weeks after return to work)    Thank you for allowing me to participate in the care of your patient. Sabiha Mcgarry D.O., P.T.   Board Certified Physical Medicine and Rehabilitation  Board Certified Electrodiagnostic Medicine

## 2020-11-05 ENCOUNTER — OFFICE VISIT (OUTPATIENT)
Dept: ORTHOPEDIC SURGERY | Age: 55
End: 2020-11-05
Payer: COMMERCIAL

## 2020-11-05 VITALS — BODY MASS INDEX: 28.88 KG/M2 | WEIGHT: 225 LBS | HEIGHT: 74 IN | TEMPERATURE: 97.5 F

## 2020-11-05 PROCEDURE — 99213 OFFICE O/P EST LOW 20 MIN: CPT | Performed by: ORTHOPAEDIC SURGERY

## 2020-11-05 NOTE — PROGRESS NOTES
resource strain: None    Food insecurity     Worry: None     Inability: None    Transportation needs     Medical: None     Non-medical: None   Tobacco Use    Smoking status: Never Smoker    Smokeless tobacco: Never Used   Substance and Sexual Activity    Alcohol use: Yes     Comment: social    Drug use: Never    Sexual activity: Yes   Lifestyle    Physical activity     Days per week: None     Minutes per session: None    Stress: None   Relationships    Social connections     Talks on phone: None     Gets together: None     Attends Sabianism service: None     Active member of club or organization: None     Attends meetings of clubs or organizations: None     Relationship status: None    Intimate partner violence     Fear of current or ex partner: None     Emotionally abused: None     Physically abused: None     Forced sexual activity: None   Other Topics Concern    None   Social History Narrative    None       Family History   Problem Relation Age of Onset    Hypertension Mother     Diabetes Father     Hypertension Brother     Stroke Brother          Review of Systems   No fever, chills, or other constitutionalsymptoms. No numbness or other neuro symptoms. No chest pain. No dyspnea. [unfilled]   Left shoulder exam demonstrates minimal tenderness to palpation over the mid clavicle without significant deformity. Mild tenderness over the acromioclavicular joint but no prominence. He is able to demonstrate essentially full active flexion abduction and internal rotation left shoulder with objectively mild discomfort. His abduction strength testing is intact to resistance and his liftoff testing also appears intact. Physical Exam    Patient is alert and oriented. Well-developed well-nourished. Pupils equal and reactive. Scleraeanicteric. Neck supple  Lungs clear. Cardiac rate and rhythm regular. Abdomen soft and nontender. Skin warm and dry.        XRAY:   X-ray today 2 views left clavicle. Oblique midshaft clavicular fracture with less than 10 degrees angulation and less than 2 mm displacement appears to have a solid bony union. Degenerative changes are present in the left acromioclavicular joint. Impression: Healed left clavicle fracture in good alignment. X-ray today AP and lateral views left shoulder. Glenohumeral joint shows mild degenerative changes. Humeral head architecture is intact without fracture or subluxation. Subacromial index appears intact. There is again noted to be acromioclavicular degenerative changes and a healed clavicle fracture in good alignment. Impression: Overall mild degenerative change of more advanced AC joint degenerative changes but no other fractures other than the previously noted healed clavicular fracture. ASSESSMENT/PLAN:    Moises Vizcarra was seen today for clavicle injury. Diagnoses and all orders for this visit:    Closed displaced fracture of left clavicle, unspecified part of clavicle, initial encounter  -     XR SHOULDER LEFT (MIN 2 VIEWS); Future  -     XR CLAVICLE LEFT; Future    Traumatic subarachnoid hemorrhage with loss of consciousness of 30 minutes or less, initial encounter [facility])    Findings and images reviewed with the patient. He had a complex high-energy injury. Soft tissue symptoms will likely persist.  At age greater than 48 it is also common to have some pre-existing AC arthritis and some cuff tendinopathy which could account for some of his symptoms and may be persistent. However he is improving so continue with conservative management. If he develops increasing or refractory focal symptoms may consider trial AC joint injection for arthrosis. He will follow-up as needed. Return if symptoms worsen or fail to improve.        Dixon Alvarez MD    11/5/2020  11:47 AM

## 2020-12-07 ENCOUNTER — TELEPHONE (OUTPATIENT)
Dept: PHYSICAL MEDICINE AND REHAB | Age: 55
End: 2020-12-07

## 2020-12-07 NOTE — TELEPHONE ENCOUNTER
Called and left message on patient voicemail that I faxed over his paper to [employer] for him to go back to work.

## 2020-12-14 ENCOUNTER — OFFICE VISIT (OUTPATIENT)
Dept: PHYSICAL MEDICINE AND REHAB | Age: 55
End: 2020-12-14
Payer: COMMERCIAL

## 2020-12-14 VITALS
BODY MASS INDEX: 29.52 KG/M2 | DIASTOLIC BLOOD PRESSURE: 79 MMHG | HEIGHT: 74 IN | WEIGHT: 230 LBS | HEART RATE: 74 BPM | SYSTOLIC BLOOD PRESSURE: 132 MMHG

## 2020-12-14 PROCEDURE — 96132 NRPSYC TST EVAL PHYS/QHP 1ST: CPT | Performed by: PHYSICAL MEDICINE & REHABILITATION

## 2020-12-14 PROCEDURE — G8427 DOCREV CUR MEDS BY ELIG CLIN: HCPCS | Performed by: PHYSICAL MEDICINE & REHABILITATION

## 2020-12-14 PROCEDURE — 1036F TOBACCO NON-USER: CPT | Performed by: PHYSICAL MEDICINE & REHABILITATION

## 2020-12-14 PROCEDURE — 99213 OFFICE O/P EST LOW 20 MIN: CPT | Performed by: PHYSICAL MEDICINE & REHABILITATION

## 2020-12-14 PROCEDURE — 3017F COLORECTAL CA SCREEN DOC REV: CPT | Performed by: PHYSICAL MEDICINE & REHABILITATION

## 2020-12-14 PROCEDURE — G8484 FLU IMMUNIZE NO ADMIN: HCPCS | Performed by: PHYSICAL MEDICINE & REHABILITATION

## 2020-12-14 PROCEDURE — G8417 CALC BMI ABV UP PARAM F/U: HCPCS | Performed by: PHYSICAL MEDICINE & REHABILITATION

## 2020-12-14 NOTE — PROGRESS NOTES
Niru Barros D.O. [facility]  [address] [facility] [place]  Phone: [phone]  Fax: [phone]        12/14/20    Chief Complaint   Patient presents with    Concussion     concussion f/u with impact       HPI:  Hunter Chapin is a 48y.o. year old man seen today in follow up regarding concussion. Interval history: Since the last visit the patient has returned to work. He has been back to work for 2 weeks at this point. He has been tolerating his work well. He has not had any headaches since the last visit. He is having some difficulty sleeping primarily with falling asleep and it is helped with Melatonin. Today, the pain is rated Pain Score:   0 - No pain where 0 is no pain and 10 is pain as bad as it can be. He is having only mild intermittent headaches. The pain is located in the left occipital region,  Radiates to the left eye, and is described as pressure. This pain occurs intermittently. The symptoms have been better since onset. Symptoms are exacerbated by over exertion. Factors which relieve the pain include rest. Other associated symptoms include nausea. Otherwise, the pain assessment has not changed since the last visit.      Review of systems    Headache No   Nausea  No   Vomiting No   Balance problems Yes   Dizziness No   Fatigue Yes   Trouble falling asleep Yes   Sleeping more than usual No   Sleeping less than usual Yes   drowsiness Yes   Sensitivity to light No   Sensitivity to noise No   Irritability Yes   Sadness Yes   Nervousness Yes   Feeling more emotional Yes   Numbness or tingling Yes   Feeling slowed down Yes   Feeling mentally foggy Yes   Difficulty concentrating Yes   Difficulty remembering Yes   Visual problems No     Past Medical History:   Diagnosis Date    Ascending aortic aneurysm (HCC) 05/01/09    per cta, 4.0 cm    Concussion     GERD (gastroesophageal reflux disease)     Hyperlipidemia     borderline  Hypertension     borderline     Past Surgical History:   Procedure Laterality Date    APPENDECTOMY      SPLENECTOMY       Social History     Tobacco Use    Smoking status: Never Smoker    Smokeless tobacco: Never Used   Substance Use Topics    Alcohol use: Yes     Comment: social    Drug use: Never     Family History   Problem Relation Age of Onset    Hypertension Mother     Diabetes Father     Hypertension Brother     Stroke Brother      Current Outpatient Medications   Medication Sig Dispense Refill    TURMERIC PO Take by mouth daily      levocetirizine (XYZAL) 5 MG tablet Take 5 mg by mouth nightly as needed       atenolol (TENORMIN) 25 MG tablet Take 25 mg by mouth daily      acetaminophen (TYLENOL) 500 MG tablet Take 1,000 mg by mouth every 6 hours as needed for Pain      Multiple Vitamin (MULTIVITAMIN PO) Take 1 tablet by mouth daily.  fish oil-omega-3 fatty acids 1000 MG capsule Take  by mouth daily. No current facility-administered medications for this visit. No Known Allergies    Review of Systems:  No new weakness, paresthesia, incontinence of bowel or bladder, saddle anesthesia, falls or gait dysfunction. Otherwise, per HPI. Physical Exam:   Blood pressure 132/79, pulse 74, height 6' 2\" (1.88 m), weight 230 lb (104.3 kg). GENERAL: The patient is in no apparent distress. Body habitus is non-obese. Musculoskeletal: There is no joint effusion, deformity, instability, swelling, erythema or warmth. AROM is full in the spine and extremities. Spinal curvatures are normal.      Neurologic:  No focal sensorimotor deficit. Reflexes 2+ and symmetric. Gait is normal. Heel and toe walking are intact. Tandem walking is unsteady. Impression:   1.  Concussion with loss of consciousness of 30 minutes or less, sequela (HCC)        Plan:  Continue Melatonin at  for sleep IMPACT TESTING: I have reviewed the post injury IMPACT testing reports. The complete IMPACT report is attached to this encounter. A baseline was not available for comparison so percentile scores were used for analysis. Compared to normal values and academic status, the visual memory composite has increased significantly and is in the expected range. The verbal memory composite is in the expected range. The visual motor speed  Composite is stable and is in the expected range. The reaction time composite has improved and is in the expected range  The Total symptoms score has improved significantly from 15 to 4. The patient was educated about the diagnosis, prognosis, indications, risks and benefits of treatment. An opportunity to ask questions was given to the patient and questions were answered. The patient agreed to proceed with the recommended treatment as described above. Follow up prn. Thank you for allowing me to participate in the care of your patient. Swetha Canas D.O., P.T.   Board Certified Physical Medicine and Rehabilitation  Board Certified Electrodiagnostic Medicine

## 2021-03-22 ENCOUNTER — IMMUNIZATION (OUTPATIENT)
Dept: PRIMARY CARE CLINIC | Age: 56
End: 2021-03-22
Payer: COMMERCIAL

## 2021-03-22 PROCEDURE — 91300 COVID-19, PFIZER VACCINE 30MCG/0.3ML DOSE: CPT | Performed by: NURSE PRACTITIONER

## 2021-03-22 PROCEDURE — 0001A COVID-19, PFIZER VACCINE 30MCG/0.3ML DOSE: CPT | Performed by: NURSE PRACTITIONER

## 2021-04-26 ENCOUNTER — IMMUNIZATION (OUTPATIENT)
Dept: PRIMARY CARE CLINIC | Age: 56
End: 2021-04-26
Payer: COMMERCIAL

## 2021-04-26 PROCEDURE — 91300 COVID-19, PFIZER VACCINE 30MCG/0.3ML DOSE: CPT | Performed by: NURSE PRACTITIONER

## 2021-04-26 PROCEDURE — 0002A COVID-19, PFIZER VACCINE 30MCG/0.3ML DOSE: CPT | Performed by: NURSE PRACTITIONER

## 2021-12-02 DIAGNOSIS — I71.20 THORACIC AORTIC ANEURYSM WITHOUT RUPTURE: Primary | ICD-10-CM

## 2022-05-02 ENCOUNTER — HOSPITAL ENCOUNTER (OUTPATIENT)
Dept: CT IMAGING | Age: 57
Discharge: HOME | End: 2022-05-04
Payer: COMMERCIAL

## 2022-05-02 DIAGNOSIS — I71.20 THORACIC AORTIC ANEURYSM WITHOUT RUPTURE: ICD-10-CM

## 2022-05-02 PROCEDURE — 71250 CT THORAX DX C-: CPT

## 2022-05-24 ENCOUNTER — SCHEDULED TELEPHONE ENCOUNTER (OUTPATIENT)
Dept: CARDIOTHORACIC SURGERY | Age: 57
End: 2022-05-24
Payer: COMMERCIAL

## 2022-05-24 DIAGNOSIS — I71.21 ASCENDING AORTIC ANEURYSM: Primary | ICD-10-CM

## 2022-05-24 PROCEDURE — 99441 PR PHYS/QHP TELEPHONE EVALUATION 5-10 MIN: CPT | Performed by: THORACIC SURGERY (CARDIOTHORACIC VASCULAR SURGERY)

## 2022-05-24 NOTE — PROGRESS NOTES
CC: Thoracic ascending aneurysm      HPI:  Jcarlos Johnston is a 65 y.o. male whom is evaluated via telephone for surveillance of an ascending aortic aneurysm. Previous ascending aortic aneurysm measurement in 2017 was 4.2 cm. He has a trileaflet aortic valve. He denies any SOB, CP, back pain, or any major complaints. He continues to take antihypertensive medication, and is a nonsmoker. Review of Systems:   Constitutional: Negative for fever and chills. Respiratory: Negative for cough, chest tightness and shortness of breath. Cardiovascular: Negative for chest pain, palpitations and leg swelling. Musculoskeletal: Negative for back pain. Neurological: Negative for dizziness, syncope and light-headedness. Objective:   Physical Exam   Constitutional: oriented to person, place, and time. No distress. Remaining PE deferred due to telephone encounter. Assessment:      4.5 cm ascending aortic aneurysm       Plan:      Continue aortic surveillance   Repeat chest CT in 1 year(s)  Importance of BP control reinforced       Educated on the importance of strict blood pressure control and tobacco free life-style to prevent progression            Jany Myra Tavares Sepulveda. is a 65 y.o. male evaluated via telephone on 5/24/2022. Consent:  He and/or health care decision maker is aware that that he may receive a bill for this telephone service, depending on his insurance coverage, and has provided verbal consent to proceed: Yes      Documentation:  I communicated with the patient and/or health care decision maker about above. Details of this discussion including any medical advice provided: as above      I affirm this is a Patient Initiated Episode with a Patient who has not had a related appointment within my department in the past 7 days or scheduled within the next 24 hours.     Patient identification was verified at the start of the visit: Yes    Total Time: minutes: 5-10 minutes    Note: not billable if this call serves to triage the patient into an appointment for the relevant concern    Erik Ferrera, DO

## 2023-07-24 DIAGNOSIS — I71.21 ANEURYSM OF ASCENDING AORTA WITHOUT RUPTURE (HCC): Primary | ICD-10-CM

## 2023-08-28 ENCOUNTER — HOSPITAL ENCOUNTER (OUTPATIENT)
Dept: CT IMAGING | Age: 58
Discharge: HOME | End: 2023-08-30
Payer: COMMERCIAL

## 2023-08-28 DIAGNOSIS — I71.21 ANEURYSM OF ASCENDING AORTA WITHOUT RUPTURE (HCC): ICD-10-CM

## 2023-08-28 PROCEDURE — 71250 CT THORAX DX C-: CPT

## 2023-08-31 ENCOUNTER — TELEPHONE (OUTPATIENT)
Dept: VASCULAR SURGERY | Age: 58
End: 2023-08-31

## 2023-08-31 NOTE — TELEPHONE ENCOUNTER
Jose Alberto Livingston wife called concerned about his ct chest scan done on 8-28-23. Do I have to schedule for phone visit asap on 9-5 or can it wait for later. Wife is very anxious saw result on MY Chart. Left message on pt. Voicemail asked pt.  To call back so that I can schedule phone visit

## 2023-09-19 ENCOUNTER — SCHEDULED TELEPHONE ENCOUNTER (OUTPATIENT)
Dept: CARDIOTHORACIC SURGERY | Age: 58
End: 2023-09-19
Payer: COMMERCIAL

## 2023-09-19 DIAGNOSIS — I71.21 ANEURYSM OF ASCENDING AORTA WITHOUT RUPTURE (HCC): Primary | ICD-10-CM

## 2023-09-19 PROCEDURE — 99441 PR PHYS/QHP TELEPHONE EVALUATION 5-10 MIN: CPT | Performed by: THORACIC SURGERY (CARDIOTHORACIC VASCULAR SURGERY)

## 2023-09-20 ENCOUNTER — TELEPHONE (OUTPATIENT)
Dept: CARDIOTHORACIC SURGERY | Age: 58
End: 2023-09-20

## 2023-09-20 DIAGNOSIS — I71.21 ANEURYSM OF ASCENDING AORTA WITHOUT RUPTURE (HCC): Primary | ICD-10-CM

## 2023-09-20 NOTE — TELEPHONE ENCOUNTER
Pt will need order placed for PARAM.  He does not have a cardiologist. Peewee Villanueva will schedule

## 2023-10-13 ENCOUNTER — HOSPITAL ENCOUNTER (OUTPATIENT)
Dept: NON INVASIVE DIAGNOSTICS | Age: 58
Discharge: HOME | End: 2023-10-13
Payer: COMMERCIAL

## 2023-10-13 DIAGNOSIS — I71.21 ANEURYSM OF ASCENDING AORTA WITHOUT RUPTURE (HCC): ICD-10-CM

## 2023-10-13 PROCEDURE — 93306 TTE W/DOPPLER COMPLETE: CPT

## 2024-06-17 ENCOUNTER — OFFICE VISIT (OUTPATIENT)
Dept: FAMILY MEDICINE CLINIC | Age: 59
End: 2024-06-17
Payer: COMMERCIAL

## 2024-06-17 VITALS
BODY MASS INDEX: 30.2 KG/M2 | SYSTOLIC BLOOD PRESSURE: 112 MMHG | TEMPERATURE: 97.3 F | OXYGEN SATURATION: 96 % | WEIGHT: 235.2 LBS | HEART RATE: 88 BPM | DIASTOLIC BLOOD PRESSURE: 76 MMHG

## 2024-06-17 DIAGNOSIS — R10.84 GENERALIZED ABDOMINAL PAIN: Primary | ICD-10-CM

## 2024-06-17 DIAGNOSIS — K59.00 CONSTIPATION, UNSPECIFIED CONSTIPATION TYPE: ICD-10-CM

## 2024-06-17 PROCEDURE — 99204 OFFICE O/P NEW MOD 45 MIN: CPT | Performed by: PHYSICIAN ASSISTANT

## 2024-06-17 RX ORDER — POLYETHYLENE GLYCOL 3350 17 G/17G
17 POWDER, FOR SOLUTION ORAL DAILY
Qty: 510 G | Refills: 0 | Status: SHIPPED | OUTPATIENT
Start: 2024-06-17 | End: 2024-07-17

## 2024-06-17 NOTE — PROGRESS NOTES
noted.  Musculoskeletal: The patient has no evidence of calf tenderness, no pitting edema, symmetrical pulses noted bilaterally  Neurological: A&O x4, normal speech        Testing:     No results found for this visit on 06/17/24.    XR ABDOMEN (KUB) (SINGLE AP VIEW)    Result Date: 6/17/2024  EXAMINATION: ONE SUPINE XRAY VIEW(S) OF THE ABDOMEN 6/17/2024 12:31 pm COMPARISON: None. HISTORY: ORDERING SYSTEM PROVIDED HISTORY: Generalized abdominal pain TECHNOLOGIST PROVIDED HISTORY: Reason for exam:->constipation/gas rule out obstruction FINDINGS: Sing of the abdomen reveal stool seen scattered diffusely throughout the colon predominately within the ascending colon.  Findings to suggest mild clinical presentation of constipation.  No signs of obvious obstruction.  No gross free intraperitoneal air.  No abnormal calcifications seen overlying the renal shadows or paths of the ureters.     Moderate stool burden seen in the ascending colon to suggest mild clinical presentation of constipation.  No obvious obstruction.        Medical Decision Making:     Vital signs reviewed    Past medical history reviewed.    Allergies reviewed.    Medications reviewed.    Patient on arrival does not appear to be in any apparent distress or discomfort.  The patient has been seen and evaluated.  The patient does not appear to be toxic or lethargic.     We did obtain a x-ray, KUB.    There is moderate stool burden seen in the a; to suggest mild clinical presentation of constipation.  No obvious obstruction.    The patient's vital signs are all stable.  The patient does not appear to be in any apparent distress.  The patient is not having any tearing sensation in the abdomen or the back.    The patient's symptoms seem to be consistent with constipation.    We discussed differential diagnosis and the need for CT scan.  The patient would like to hold off on ER evaluation at this time.  The patient would like to try MiraLAX to see if this would

## 2024-06-19 ENCOUNTER — TELEPHONE (OUTPATIENT)
Dept: FAMILY MEDICINE CLINIC | Age: 59
End: 2024-06-19

## 2024-06-19 NOTE — TELEPHONE ENCOUNTER
I called and spoke with the patient via phone.  He is feeling better.  Had substantial bowel movement today.  He will continue with the medication for the next several days to see if he can have further bowel movements.

## 2024-07-06 ENCOUNTER — OFFICE VISIT (OUTPATIENT)
Dept: FAMILY MEDICINE CLINIC | Age: 59
End: 2024-07-06
Payer: COMMERCIAL

## 2024-07-06 VITALS
HEIGHT: 74 IN | TEMPERATURE: 97.4 F | RESPIRATION RATE: 20 BRPM | SYSTOLIC BLOOD PRESSURE: 126 MMHG | OXYGEN SATURATION: 97 % | BODY MASS INDEX: 30.16 KG/M2 | WEIGHT: 235 LBS | DIASTOLIC BLOOD PRESSURE: 82 MMHG | HEART RATE: 90 BPM

## 2024-07-06 DIAGNOSIS — K43.2 INCISIONAL HERNIA, WITHOUT OBSTRUCTION OR GANGRENE: ICD-10-CM

## 2024-07-06 DIAGNOSIS — R10.9 RIGHT FLANK PAIN: Primary | ICD-10-CM

## 2024-07-06 LAB
APPEARANCE FLUID: CLEAR
BILIRUBIN, POC: NEGATIVE
BLOOD URINE, POC: NORMAL
CLARITY, POC: CLEAR
COLOR, POC: YELLOW
GLUCOSE URINE, POC: NEGATIVE
KETONES, POC: NEGATIVE
LEUKOCYTE EST, POC: NEGATIVE
NITRITE, POC: NEGATIVE
PH, POC: 6
PROTEIN, POC: NEGATIVE
SPECIFIC GRAVITY, POC: 1.02
UROBILINOGEN, POC: 0.2

## 2024-07-06 PROCEDURE — 99213 OFFICE O/P EST LOW 20 MIN: CPT | Performed by: PHYSICIAN ASSISTANT

## 2024-07-06 PROCEDURE — 81002 URINALYSIS NONAUTO W/O SCOPE: CPT | Performed by: PHYSICIAN ASSISTANT

## 2024-07-06 RX ORDER — NAPROXEN 500 MG/1
500 TABLET ORAL 2 TIMES DAILY PRN
Qty: 14 TABLET | Refills: 0 | Status: SHIPPED | OUTPATIENT
Start: 2024-07-06

## 2024-07-06 NOTE — PROGRESS NOTES
Chief Complaint       Flank Pain (Right sided, tenderness from ribs to hip x 1 week)      History of Present Illness   Source of history provided by: patient.      Rui Chapin Jr. is a 59 y.o. old male presenting to the walk in clinic for evaluation of vague right-sided flank pain which has been present for the past week.  Patient reports that he has been installing a deck recently and as such, he has been constantly straining.  He was evaluated in our office a few weeks ago for generalized abdominal pain and subsequently diagnosed with constipation.  He reports that his bowel movements are now regular.  Denies any nausea, vomiting, or diarrhea.  Patient denies any history of kidney stones or urinary symptoms.  Patient does have a previous history of an appendectomy at age 5. Denies any fever, chills, CP, SOB, dysuria, hematuria, change in stool color/consistency, melena, hematemesis, coffee-ground emesis, HA, sore throat, rash, or lethargy.     ROS    Unless otherwise stated in this report or unable to obtain because of the patient's clinical or mental status as evidenced by the medical record, this patients's positive and negative responses for Review of Systems, constitutional, psych, eyes, ENT, cardiovascular, respiratory, gastrointestinal, neurological, genitourinary, musculoskeletal, integument systems and systems related to the presenting problem are either stated in the preceding or were not pertinent or were negative for the symptoms and/or complaints related to the medical problem.     Past Medical History:  has a past medical history of Aneurysm (HCC), Ascending aortic aneurysm (HCC), Concussion, GERD (gastroesophageal reflux disease), Hyperlipidemia, and Hypertension.  Past Surgical History:  has a past surgical history that includes Appendectomy and Splenectomy.  Social History:  reports that he has never smoked. He has never used smokeless tobacco. He reports current alcohol use. He reports that

## 2024-08-03 ENCOUNTER — OFFICE VISIT (OUTPATIENT)
Dept: FAMILY MEDICINE CLINIC | Age: 59
End: 2024-08-03
Payer: COMMERCIAL

## 2024-08-03 VITALS
SYSTOLIC BLOOD PRESSURE: 110 MMHG | DIASTOLIC BLOOD PRESSURE: 72 MMHG | RESPIRATION RATE: 17 BRPM | HEIGHT: 74 IN | TEMPERATURE: 97.7 F | BODY MASS INDEX: 30.16 KG/M2 | HEART RATE: 83 BPM | OXYGEN SATURATION: 96 % | WEIGHT: 235 LBS

## 2024-08-03 DIAGNOSIS — M79.672 LEFT FOOT PAIN: Primary | ICD-10-CM

## 2024-08-03 PROCEDURE — 99213 OFFICE O/P EST LOW 20 MIN: CPT

## 2024-08-03 RX ORDER — CIPROFLOXACIN 500 MG/1
500 TABLET, FILM COATED ORAL 2 TIMES DAILY
Qty: 14 TABLET | Refills: 0 | Status: SHIPPED | OUTPATIENT
Start: 2024-08-03 | End: 2024-08-10

## 2024-08-03 ASSESSMENT — ENCOUNTER SYMPTOMS
DIARRHEA: 0
SHORTNESS OF BREATH: 0
APNEA: 0
ABDOMINAL PAIN: 0
NAUSEA: 0
SORE THROAT: 0
VOMITING: 0
COUGH: 0

## 2024-08-03 NOTE — PROGRESS NOTES
rash.   Neurological:  Negative for dizziness, weakness, numbness and headaches.       Patient's medical, social, and family history reviewed    Past Medical History:  has a past medical history of Aneurysm (HCC), Ascending aortic aneurysm (HCC), Concussion, GERD (gastroesophageal reflux disease), Hyperlipidemia, and Hypertension.   Past Surgical History:  has a past surgical history that includes Appendectomy and Splenectomy.  Social History:  reports that he has never smoked. He has never used smokeless tobacco. He reports current alcohol use. He reports that he does not use drugs.  Family History: family history includes Diabetes in his father; Hypertension in his brother and mother; Stroke in his brother.  Allergies: Patient has no known allergies.    Physical Exam   Vital Signs:  /72 (Site: Right Upper Arm, Position: Sitting)   Pulse 83   Temp 97.7 °F (36.5 °C) (Temporal)   Resp 17   Ht 1.88 m (6' 2\")   Wt 106.6 kg (235 lb)   SpO2 96%   BMI 30.17 kg/m²    Oxygen Saturation Interpretation: Normal.    Physical Exam  Constitutional:       Appearance: Normal appearance.   HENT:      Right Ear: Tympanic membrane and ear canal normal.      Left Ear: Tympanic membrane and ear canal normal.      Mouth/Throat:      Mouth: Mucous membranes are moist.      Pharynx: Oropharynx is clear.   Eyes:      Conjunctiva/sclera: Conjunctivae normal.      Pupils: Pupils are equal, round, and reactive to light.   Cardiovascular:      Rate and Rhythm: Normal rate and regular rhythm.      Pulses: Normal pulses.           Dorsalis pedis pulses are 2+ on the left side.        Posterior tibial pulses are 2+ on the left side.      Heart sounds: Normal heart sounds.   Pulmonary:      Effort: Pulmonary effort is normal.      Breath sounds: Normal breath sounds.   Abdominal:      General: Abdomen is flat. Bowel sounds are normal. There is no distension.      Palpations: Abdomen is soft.      Tenderness: There is no abdominal

## 2024-09-09 DIAGNOSIS — I71.21 ANEURYSM OF ASCENDING AORTA WITHOUT RUPTURE (HCC): Primary | ICD-10-CM

## 2024-10-14 ENCOUNTER — HOSPITAL ENCOUNTER (OUTPATIENT)
Dept: CT IMAGING | Age: 59
Discharge: HOME | End: 2024-10-16
Payer: COMMERCIAL

## 2024-10-14 DIAGNOSIS — I71.21 ANEURYSM OF ASCENDING AORTA WITHOUT RUPTURE (HCC): ICD-10-CM

## 2024-10-14 PROCEDURE — 71250 CT THORAX DX C-: CPT

## 2024-10-15 ENCOUNTER — SCHEDULED TELEPHONE ENCOUNTER (OUTPATIENT)
Dept: CARDIOTHORACIC SURGERY | Age: 59
End: 2024-10-15
Payer: COMMERCIAL

## 2024-10-15 DIAGNOSIS — I71.21 ANEURYSM OF ASCENDING AORTA WITHOUT RUPTURE (HCC): Primary | ICD-10-CM

## 2024-10-15 PROCEDURE — 99441 PR PHYS/QHP TELEPHONE EVALUATION 5-10 MIN: CPT | Performed by: THORACIC SURGERY (CARDIOTHORACIC VASCULAR SURGERY)

## 2024-10-15 NOTE — PROGRESS NOTES
CC: Thoracic ascending aneurysm      HPI:  Rui Chapin Jr. is a 59 y.o. male whom is evaluated via telephone for surveillance of an ascending aortic aneurysm. Previous ascending aortic aneurysm measurement 1 year ago was 4.6 cm. He denies any SOB, CP, back pain, or any major complaints. He continues to take antihypertensive medication, and is a nonsmoker.        Review of Systems:   Constitutional: Negative for fever and chills.   Respiratory: Negative for cough, chest tightness and shortness of breath.    Cardiovascular: Negative for chest pain, palpitations and leg swelling.   Musculoskeletal: Negative for back pain.   Neurological: Negative for dizziness, syncope and light-headedness.       Objective:   Physical Exam   Constitutional: oriented to person, place, and time. No distress.     Remaining PE deferred due to telephone encounter.    Assessment:      Stable 4.6 cm ascending aortic aneurysm       Plan:      Repeat chest CT in 1 year(s)  Importance of BP control reinforced       Educated on the importance of strict blood pressure control and tobacco free life-style to prevent progression            Rui Chapin Jr. is a 59 y.o. male evaluated via telephone on 10/15/2024.      Consent:  He and/or health care decision maker is aware that that he may receive a bill for this telephone service, depending on his insurance coverage, and has provided verbal consent to proceed: Yes      Documentation:  I communicated with the patient and/or health care decision maker about above.   Details of this discussion including any medical advice provided: as above      I affirm this is a Patient Initiated Episode with a Patient who has not had a related appointment within my department in the past 7 days or scheduled within the next 24 hours.    Patient identification was verified at the start of the visit: Yes    Total Time: minutes: 5-10 minutes    Note: not billable if this call serves to triage the patient into an